# Patient Record
Sex: FEMALE | Race: BLACK OR AFRICAN AMERICAN | NOT HISPANIC OR LATINO | URBAN - METROPOLITAN AREA
[De-identification: names, ages, dates, MRNs, and addresses within clinical notes are randomized per-mention and may not be internally consistent; named-entity substitution may affect disease eponyms.]

---

## 2018-08-31 ENCOUNTER — OUTPATIENT (OUTPATIENT)
Dept: OUTPATIENT SERVICES | Facility: HOSPITAL | Age: 63
LOS: 1 days | End: 2018-08-31
Payer: COMMERCIAL

## 2018-08-31 VITALS
SYSTOLIC BLOOD PRESSURE: 159 MMHG | TEMPERATURE: 98 F | RESPIRATION RATE: 14 BRPM | HEART RATE: 70 BPM | DIASTOLIC BLOOD PRESSURE: 84 MMHG | OXYGEN SATURATION: 97 % | HEIGHT: 63 IN | WEIGHT: 186.07 LBS

## 2018-08-31 DIAGNOSIS — Z98.890 OTHER SPECIFIED POSTPROCEDURAL STATES: Chronic | ICD-10-CM

## 2018-08-31 DIAGNOSIS — D25.9 LEIOMYOMA OF UTERUS, UNSPECIFIED: ICD-10-CM

## 2018-08-31 DIAGNOSIS — Z87.42 PERSONAL HISTORY OF OTHER DISEASES OF THE FEMALE GENITAL TRACT: Chronic | ICD-10-CM

## 2018-08-31 DIAGNOSIS — Z01.818 ENCOUNTER FOR OTHER PREPROCEDURAL EXAMINATION: ICD-10-CM

## 2018-08-31 LAB
ALBUMIN SERPL ELPH-MCNC: 3.6 G/DL — SIGNIFICANT CHANGE UP (ref 3.3–5)
ALP SERPL-CCNC: 91 U/L — SIGNIFICANT CHANGE UP (ref 40–120)
ALT FLD-CCNC: 17 U/L — SIGNIFICANT CHANGE UP (ref 12–78)
ANION GAP SERPL CALC-SCNC: 7 MMOL/L — SIGNIFICANT CHANGE UP (ref 5–17)
AST SERPL-CCNC: 15 U/L — SIGNIFICANT CHANGE UP (ref 15–37)
BILIRUB SERPL-MCNC: 0.5 MG/DL — SIGNIFICANT CHANGE UP (ref 0.2–1.2)
BUN SERPL-MCNC: 8 MG/DL — SIGNIFICANT CHANGE UP (ref 7–23)
CALCIUM SERPL-MCNC: 8.8 MG/DL — SIGNIFICANT CHANGE UP (ref 8.5–10.1)
CHLORIDE SERPL-SCNC: 106 MMOL/L — SIGNIFICANT CHANGE UP (ref 96–108)
CO2 SERPL-SCNC: 31 MMOL/L — SIGNIFICANT CHANGE UP (ref 22–31)
CREAT SERPL-MCNC: 0.76 MG/DL — SIGNIFICANT CHANGE UP (ref 0.5–1.3)
GLUCOSE SERPL-MCNC: 123 MG/DL — HIGH (ref 70–99)
HCT VFR BLD CALC: 41.2 % — SIGNIFICANT CHANGE UP (ref 34.5–45)
HGB BLD-MCNC: 13.2 G/DL — SIGNIFICANT CHANGE UP (ref 11.5–15.5)
MCHC RBC-ENTMCNC: 25.9 PG — LOW (ref 27–34)
MCHC RBC-ENTMCNC: 32 GM/DL — SIGNIFICANT CHANGE UP (ref 32–36)
MCV RBC AUTO: 80.8 FL — SIGNIFICANT CHANGE UP (ref 80–100)
NRBC # BLD: 0 /100 WBCS — SIGNIFICANT CHANGE UP (ref 0–0)
PLATELET # BLD AUTO: 241 K/UL — SIGNIFICANT CHANGE UP (ref 150–400)
POTASSIUM SERPL-MCNC: 3.6 MMOL/L — SIGNIFICANT CHANGE UP (ref 3.5–5.3)
POTASSIUM SERPL-SCNC: 3.6 MMOL/L — SIGNIFICANT CHANGE UP (ref 3.5–5.3)
PROT SERPL-MCNC: 8.3 G/DL — SIGNIFICANT CHANGE UP (ref 6–8.3)
RBC # BLD: 5.1 M/UL — SIGNIFICANT CHANGE UP (ref 3.8–5.2)
RBC # FLD: 14.5 % — SIGNIFICANT CHANGE UP (ref 10.3–14.5)
SODIUM SERPL-SCNC: 144 MMOL/L — SIGNIFICANT CHANGE UP (ref 135–145)
WBC # BLD: 3.99 K/UL — SIGNIFICANT CHANGE UP (ref 3.8–10.5)
WBC # FLD AUTO: 3.99 K/UL — SIGNIFICANT CHANGE UP (ref 3.8–10.5)

## 2018-08-31 PROCEDURE — 93005 ELECTROCARDIOGRAM TRACING: CPT

## 2018-08-31 PROCEDURE — 85027 COMPLETE CBC AUTOMATED: CPT

## 2018-08-31 PROCEDURE — 80053 COMPREHEN METABOLIC PANEL: CPT

## 2018-08-31 PROCEDURE — G0463: CPT

## 2018-08-31 PROCEDURE — 86850 RBC ANTIBODY SCREEN: CPT

## 2018-08-31 PROCEDURE — 86901 BLOOD TYPING SEROLOGIC RH(D): CPT

## 2018-08-31 PROCEDURE — 86900 BLOOD TYPING SEROLOGIC ABO: CPT

## 2018-08-31 PROCEDURE — 93010 ELECTROCARDIOGRAM REPORT: CPT

## 2018-08-31 NOTE — H&P PST ADULT - PROBLEM SELECTOR PLAN 1
PST Labs; CBC, CMP, Type & Screen, EKG - Medical Clearance scheduled with Dr Burgess for Tuesday 9/4/18 @ 11:00 AM - Pt instructed to take Norvasc with small sip of water morning of surgery -pre-op instructions as well as pre-op wash instructions given to pt with understanding verbalized

## 2018-08-31 NOTE — H&P PST ADULT - HISTORY OF PRESENT ILLNESS
63 year old female  PMH HTN, Arthritis presents for PST prior to Total Abdominal Hysterectomy W/Bilateral Salpingo-Oopherectomy with Dr Solis Ruano on 18 - pt notes H/O Uterina Fibroids which she reports "sometimes are very painful." Notes "one time episode of post menopausal bleeding about 6 months ago but just that one time." Following discussions with Dr Ruano pt is electing for scheduled procedure. 63 year old female  PMH HTN, Arthritis presents for PST prior to Total Abdominal Hysterectomy W/Bilateral Salpingo-Oopherectomy with Dr Solis Ruano on 18 - pt notes H/O Uterina Fibroids which she reports "sometimes are very painful." Notes "one time episode of post menopausal bleeding about 6 months ago but just that one time." Pt notes there are multiple fibroids at least 5 but one is large." Pt also reports "pain when I bend over." - Denies use of any OTC pain medications - also reports intermittent cramping. Following discussions with Dr Ruano pt is electing for scheduled procedure.

## 2018-08-31 NOTE — H&P PST ADULT - ANESTHESIA, PREVIOUS REACTION, PROFILE
Doesn't remember what complications but states "they said they had to place tube down my throat for the colonoscopy" - pt states "I recovered fine"/respiratory complications

## 2018-08-31 NOTE — H&P PST ADULT - FAMILY HISTORY
Father  Still living? No  Family history of heart attack, Age at diagnosis: Age Unknown     Mother  Still living? No  Family history of stroke, Age at diagnosis: Age Unknown

## 2018-08-31 NOTE — H&P PST ADULT - ASSESSMENT
63 year old female with Leiomyoma of Uterus, Unspecified - scheduled for Total Abdominal Hysterectomy W/Bilateral Salpingo-Oopherectomy with Dr Solis Ruano on 9/12/18 -

## 2019-07-05 PROBLEM — I10 ESSENTIAL (PRIMARY) HYPERTENSION: Chronic | Status: ACTIVE | Noted: 2018-08-31

## 2019-07-05 PROBLEM — M54.5 LOW BACK PAIN: Chronic | Status: ACTIVE | Noted: 2018-08-31

## 2019-07-05 PROBLEM — D25.9 LEIOMYOMA OF UTERUS, UNSPECIFIED: Chronic | Status: ACTIVE | Noted: 2018-08-31

## 2019-07-05 PROBLEM — M19.90 UNSPECIFIED OSTEOARTHRITIS, UNSPECIFIED SITE: Chronic | Status: ACTIVE | Noted: 2018-08-31

## 2019-08-08 ENCOUNTER — APPOINTMENT (OUTPATIENT)
Dept: OBGYN | Facility: CLINIC | Age: 64
End: 2019-08-08

## 2019-08-14 ENCOUNTER — APPOINTMENT (OUTPATIENT)
Dept: OBGYN | Facility: CLINIC | Age: 64
End: 2019-08-14
Payer: COMMERCIAL

## 2019-08-14 PROCEDURE — 99243 OFF/OP CNSLTJ NEW/EST LOW 30: CPT

## 2019-10-18 ENCOUNTER — OUTPATIENT (OUTPATIENT)
Dept: OUTPATIENT SERVICES | Facility: HOSPITAL | Age: 64
LOS: 1 days | End: 2019-10-18
Payer: COMMERCIAL

## 2019-10-18 VITALS
WEIGHT: 190.04 LBS | HEIGHT: 60.5 IN | RESPIRATION RATE: 17 BRPM | HEART RATE: 75 BPM | OXYGEN SATURATION: 96 % | DIASTOLIC BLOOD PRESSURE: 88 MMHG | SYSTOLIC BLOOD PRESSURE: 153 MMHG | TEMPERATURE: 98 F

## 2019-10-18 DIAGNOSIS — Z01.818 ENCOUNTER FOR OTHER PREPROCEDURAL EXAMINATION: ICD-10-CM

## 2019-10-18 DIAGNOSIS — D25.1 INTRAMURAL LEIOMYOMA OF UTERUS: ICD-10-CM

## 2019-10-18 DIAGNOSIS — Z98.890 OTHER SPECIFIED POSTPROCEDURAL STATES: Chronic | ICD-10-CM

## 2019-10-18 DIAGNOSIS — Z87.42 PERSONAL HISTORY OF OTHER DISEASES OF THE FEMALE GENITAL TRACT: Chronic | ICD-10-CM

## 2019-10-18 PROCEDURE — G0463: CPT

## 2019-10-18 PROCEDURE — 80048 BASIC METABOLIC PNL TOTAL CA: CPT

## 2019-10-18 PROCEDURE — 85027 COMPLETE CBC AUTOMATED: CPT

## 2019-10-18 PROCEDURE — 86900 BLOOD TYPING SEROLOGIC ABO: CPT

## 2019-10-18 PROCEDURE — 86901 BLOOD TYPING SEROLOGIC RH(D): CPT

## 2019-10-18 PROCEDURE — 83036 HEMOGLOBIN GLYCOSYLATED A1C: CPT

## 2019-10-18 PROCEDURE — 86850 RBC ANTIBODY SCREEN: CPT

## 2019-10-18 RX ORDER — LIDOCAINE HCL 20 MG/ML
0.2 VIAL (ML) INJECTION ONCE
Refills: 0 | Status: DISCONTINUED | OUTPATIENT
Start: 2019-10-28 | End: 2019-10-28

## 2019-10-18 RX ORDER — CEFOTETAN DISODIUM 1 G
2 VIAL (EA) INJECTION ONCE
Refills: 0 | Status: COMPLETED | OUTPATIENT
Start: 2019-10-28 | End: 2019-10-28

## 2019-10-18 RX ORDER — SODIUM CHLORIDE 9 MG/ML
3 INJECTION INTRAMUSCULAR; INTRAVENOUS; SUBCUTANEOUS EVERY 8 HOURS
Refills: 0 | Status: DISCONTINUED | OUTPATIENT
Start: 2019-10-28 | End: 2019-10-28

## 2019-10-18 RX ORDER — FAMOTIDINE 10 MG/ML
20 INJECTION INTRAVENOUS ONCE
Refills: 0 | Status: COMPLETED | OUTPATIENT
Start: 2019-10-28 | End: 2019-10-28

## 2019-10-18 RX ORDER — CELECOXIB 200 MG/1
200 CAPSULE ORAL ONCE
Refills: 0 | Status: COMPLETED | OUTPATIENT
Start: 2019-10-28 | End: 2019-10-28

## 2019-10-18 NOTE — H&P PST ADULT - HISTORY OF PRESENT ILLNESS
64 year old female with hx of hypertension and fibroids.  The fibroids have increased in size causing back pain. Work up referred   for laparoscopic hysterectomy.

## 2019-10-18 NOTE — H&P PST ADULT - NSICDXPROBLEM_GEN_ALL_CORE_FT
PROBLEM DIAGNOSES  Problem: Intramural leiomyoma of uterus  Assessment and Plan: Total laparoscopic hysterectomy  bilateral salpingo oophorectomy  possible exploratory laparotomy  possible total abdominal hysterectomy

## 2019-10-18 NOTE — H&P PST ADULT - ATTENDING COMMENTS
pt with pelvic pain and enlarging fibroids for TLH BSO the risks and alternatives have been discussed .

## 2019-10-18 NOTE — H&P PST ADULT - NSICDXFAMILYHX_GEN_ALL_CORE_FT
FAMILY HISTORY:  Father  Still living? No  Family history of heart attack, Age at diagnosis: Age Unknown    Mother  Still living? No  Family history of stroke, Age at diagnosis: Age Unknown FAMILY HISTORY:  Family history of clotting disorder, 2 sisters  and one  brother    Father  Still living? No  Family history of heart attack, Age at diagnosis: Age Unknown    Mother  Still living? No  Family history of stroke, Age at diagnosis: Age Unknown

## 2019-10-18 NOTE — H&P PST ADULT - NSICDXPASTMEDICALHX_GEN_ALL_CORE_FT
PAST MEDICAL HISTORY:  Arthritis Bilateral Knees    Hypertension     Leiomyoma of uterus, unspecified     Low back pain PAST MEDICAL HISTORY:  Arthritis Bilateral Knees    History of blood clotting disorder work up by hematology results pending    Hypertension     Leiomyoma of uterus, unspecified     Low back pain

## 2019-10-27 ENCOUNTER — TRANSCRIPTION ENCOUNTER (OUTPATIENT)
Age: 64
End: 2019-10-27

## 2019-10-28 ENCOUNTER — OUTPATIENT (OUTPATIENT)
Dept: OUTPATIENT SERVICES | Facility: HOSPITAL | Age: 64
LOS: 1 days | End: 2019-10-28
Payer: COMMERCIAL

## 2019-10-28 ENCOUNTER — APPOINTMENT (OUTPATIENT)
Dept: OBGYN | Facility: HOSPITAL | Age: 64
End: 2019-10-28

## 2019-10-28 ENCOUNTER — RESULT REVIEW (OUTPATIENT)
Age: 64
End: 2019-10-28

## 2019-10-28 VITALS
WEIGHT: 194.01 LBS | HEART RATE: 83 BPM | RESPIRATION RATE: 18 BRPM | TEMPERATURE: 98 F | HEIGHT: 62 IN | OXYGEN SATURATION: 99 % | DIASTOLIC BLOOD PRESSURE: 90 MMHG | SYSTOLIC BLOOD PRESSURE: 145 MMHG

## 2019-10-28 DIAGNOSIS — D25.1 INTRAMURAL LEIOMYOMA OF UTERUS: ICD-10-CM

## 2019-10-28 DIAGNOSIS — Z87.42 PERSONAL HISTORY OF OTHER DISEASES OF THE FEMALE GENITAL TRACT: Chronic | ICD-10-CM

## 2019-10-28 DIAGNOSIS — Z98.890 OTHER SPECIFIED POSTPROCEDURAL STATES: Chronic | ICD-10-CM

## 2019-10-28 LAB — GLUCOSE BLDC GLUCOMTR-MCNC: 91 MG/DL — SIGNIFICANT CHANGE UP (ref 70–99)

## 2019-10-28 PROCEDURE — 58573 TLH W/T/O UTERUS OVER 250 G: CPT | Mod: 80

## 2019-10-28 PROCEDURE — 88305 TISSUE EXAM BY PATHOLOGIST: CPT | Mod: 26

## 2019-10-28 PROCEDURE — 58573 TLH W/T/O UTERUS OVER 250 G: CPT

## 2019-10-28 PROCEDURE — 88307 TISSUE EXAM BY PATHOLOGIST: CPT | Mod: 26

## 2019-10-28 RX ORDER — KETOROLAC TROMETHAMINE 30 MG/ML
30 SYRINGE (ML) INJECTION EVERY 6 HOURS
Refills: 0 | Status: COMPLETED | OUTPATIENT
Start: 2019-10-28 | End: 2019-11-02

## 2019-10-28 RX ORDER — ACETAMINOPHEN 500 MG
975 TABLET ORAL ONCE
Refills: 0 | Status: COMPLETED | OUTPATIENT
Start: 2019-10-28 | End: 2019-10-28

## 2019-10-28 RX ORDER — OXYCODONE HYDROCHLORIDE 5 MG/1
5 TABLET ORAL EVERY 4 HOURS
Refills: 0 | Status: DISCONTINUED | OUTPATIENT
Start: 2019-10-28 | End: 2019-10-28

## 2019-10-28 RX ORDER — POLYETHYLENE GLYCOL 3350 17 G/17G
17 POWDER, FOR SOLUTION ORAL EVERY 12 HOURS
Refills: 0 | Status: DISCONTINUED | OUTPATIENT
Start: 2019-10-28 | End: 2019-11-16

## 2019-10-28 RX ORDER — HEPARIN SODIUM 5000 [USP'U]/ML
5000 INJECTION INTRAVENOUS; SUBCUTANEOUS EVERY 12 HOURS
Refills: 0 | Status: DISCONTINUED | OUTPATIENT
Start: 2019-10-28 | End: 2019-11-16

## 2019-10-28 RX ORDER — SIMETHICONE 80 MG/1
80 TABLET, CHEWABLE ORAL
Refills: 0 | Status: DISCONTINUED | OUTPATIENT
Start: 2019-10-28 | End: 2019-11-16

## 2019-10-28 RX ORDER — ACETAMINOPHEN 500 MG
975 TABLET ORAL EVERY 6 HOURS
Refills: 0 | Status: DISCONTINUED | OUTPATIENT
Start: 2019-10-28 | End: 2019-11-16

## 2019-10-28 RX ORDER — HYDROMORPHONE HYDROCHLORIDE 2 MG/ML
0.25 INJECTION INTRAMUSCULAR; INTRAVENOUS; SUBCUTANEOUS
Refills: 0 | Status: DISCONTINUED | OUTPATIENT
Start: 2019-10-28 | End: 2019-10-29

## 2019-10-28 RX ORDER — SODIUM CHLORIDE 9 MG/ML
1000 INJECTION, SOLUTION INTRAVENOUS
Refills: 0 | Status: DISCONTINUED | OUTPATIENT
Start: 2019-10-28 | End: 2019-10-29

## 2019-10-28 RX ORDER — CHOLECALCIFEROL (VITAMIN D3) 125 MCG
1 CAPSULE ORAL
Qty: 0 | Refills: 0 | DISCHARGE

## 2019-10-28 RX ORDER — ONDANSETRON 8 MG/1
4 TABLET, FILM COATED ORAL ONCE
Refills: 0 | Status: DISCONTINUED | OUTPATIENT
Start: 2019-10-28 | End: 2019-10-29

## 2019-10-28 RX ORDER — AMLODIPINE BESYLATE 2.5 MG/1
1 TABLET ORAL
Qty: 0 | Refills: 0 | DISCHARGE

## 2019-10-28 RX ADMIN — Medication 975 MILLIGRAM(S): at 23:38

## 2019-10-28 RX ADMIN — CELECOXIB 200 MILLIGRAM(S): 200 CAPSULE ORAL at 14:01

## 2019-10-28 RX ADMIN — FAMOTIDINE 20 MILLIGRAM(S): 10 INJECTION INTRAVENOUS at 14:01

## 2019-10-28 RX ADMIN — SIMETHICONE 80 MILLIGRAM(S): 80 TABLET, CHEWABLE ORAL at 23:38

## 2019-10-28 RX ADMIN — SODIUM CHLORIDE 125 MILLILITER(S): 9 INJECTION, SOLUTION INTRAVENOUS at 20:44

## 2019-10-28 RX ADMIN — Medication 30 MILLIGRAM(S): at 23:38

## 2019-10-28 NOTE — CHART NOTE - NSCHARTNOTEFT_GEN_A_CORE
R2 OBGYN POST-OP CHECK    S: Patient seen and evaluated at bedside.  Pt awake and alert resting comfortaby in bed   Patient reports pain controlled with analgesia. Pt denies N/V, SOB, CP, palpitations, fever/chills. Voiding spontaneously. Has been OOB. Not yet tolerated PO    O:   T(C): 36.9 (10-28-19 @ 18:15), Max: 36.9 (10-28-19 @ 18:15)  HR: 89 (10-28-19 @ 20:30) (84 - 89)  BP: 148/77 (10-28-19 @ 20:30) (128/62 - 148/77)  RR: 15 (10-28-19 @ 20:30) (14 - 16)  SpO2: 99% (10-28-19 @ 20:30) (93% - 99%)  Wt(kg): --  I&O's Summary    28 Oct 2019 07:01  -  28 Oct 2019 20:54  --------------------------------------------------------  IN: 375 mL / OUT: 350 mL / NET: 25 mL        Gen: Resting comfortably in bed, NAD  CV: S1S2, RRR  Lungs: CTA B/L  Abd: soft, appropriately tender, Non-distended, no guarding/rebound  Inc: 4port incisions Clean/dry/intact w/ bandage in place  Ext: SCD's in place and functional, non-tender b/l, no edema        A/P: 64y Female s/p TLH, BSO, mini lap for specimen removal doing well post-operatively.    Neuro: PO Analgesia PRN   CV: Hemodynamically stable.  Monitor VS. CBC in AM.   Pulm: Saturating well on room air.  Encourage OOB and incentive spirometer use.   GI: Advance to regular diet. Anti-emetics PRN.  : Voiding  FEN: Electrolytes: LR@125cc/hr.  Heme: DVT ppx w/ SCD's while in bed. Early ambulation, initially with assistance then as tolerated.   ID: Afebrile  Endo: No active issues   Dispo: Discharge from PACU when criteria met.     Thomas Azar PGY-2

## 2019-10-28 NOTE — BRIEF OPERATIVE NOTE - NSICDXBRIEFPROCEDURE_GEN_ALL_CORE_FT
PROCEDURES:  Laparoscopic bilateral salpingo-oophorectomy 28-Oct-2019 18:07:25  Meryl Martel  Laparoscopic hysterectomy, total, uterus greater than 250 g 28-Oct-2019 18:07:13  Meryl Martel

## 2019-10-29 ENCOUNTER — TRANSCRIPTION ENCOUNTER (OUTPATIENT)
Age: 64
End: 2019-10-29

## 2019-10-29 VITALS
TEMPERATURE: 98 F | HEART RATE: 89 BPM | RESPIRATION RATE: 18 BRPM | OXYGEN SATURATION: 98 % | DIASTOLIC BLOOD PRESSURE: 83 MMHG | SYSTOLIC BLOOD PRESSURE: 139 MMHG

## 2019-10-29 DIAGNOSIS — Z98.890 OTHER SPECIFIED POSTPROCEDURAL STATES: ICD-10-CM

## 2019-10-29 LAB
BASOPHILS # BLD AUTO: 0.01 K/UL — SIGNIFICANT CHANGE UP (ref 0–0.2)
BASOPHILS NFR BLD AUTO: 0.1 % — SIGNIFICANT CHANGE UP (ref 0–2)
EOSINOPHIL # BLD AUTO: 0.14 K/UL — SIGNIFICANT CHANGE UP (ref 0–0.5)
EOSINOPHIL NFR BLD AUTO: 1.7 % — SIGNIFICANT CHANGE UP (ref 0–6)
HCT VFR BLD CALC: 40.3 % — SIGNIFICANT CHANGE UP (ref 34.5–45)
HGB BLD-MCNC: 12.6 G/DL — SIGNIFICANT CHANGE UP (ref 11.5–15.5)
IMM GRANULOCYTES NFR BLD AUTO: 0.2 % — SIGNIFICANT CHANGE UP (ref 0–1.5)
LYMPHOCYTES # BLD AUTO: 0.96 K/UL — LOW (ref 1–3.3)
LYMPHOCYTES # BLD AUTO: 11.7 % — LOW (ref 13–44)
MCHC RBC-ENTMCNC: 25 PG — LOW (ref 27–34)
MCHC RBC-ENTMCNC: 31.3 GM/DL — LOW (ref 32–36)
MCV RBC AUTO: 80.1 FL — SIGNIFICANT CHANGE UP (ref 80–100)
MONOCYTES # BLD AUTO: 0.28 K/UL — SIGNIFICANT CHANGE UP (ref 0–0.9)
MONOCYTES NFR BLD AUTO: 3.4 % — SIGNIFICANT CHANGE UP (ref 2–14)
NEUTROPHILS # BLD AUTO: 6.83 K/UL — SIGNIFICANT CHANGE UP (ref 1.8–7.4)
NEUTROPHILS NFR BLD AUTO: 82.9 % — HIGH (ref 43–77)
NRBC # BLD: 0 /100 WBCS — SIGNIFICANT CHANGE UP (ref 0–0)
PLATELET # BLD AUTO: 239 K/UL — SIGNIFICANT CHANGE UP (ref 150–400)
RBC # BLD: 5.03 M/UL — SIGNIFICANT CHANGE UP (ref 3.8–5.2)
RBC # FLD: 13.8 % — SIGNIFICANT CHANGE UP (ref 10.3–14.5)
WBC # BLD: 8.24 K/UL — SIGNIFICANT CHANGE UP (ref 3.8–10.5)
WBC # FLD AUTO: 8.24 K/UL — SIGNIFICANT CHANGE UP (ref 3.8–10.5)

## 2019-10-29 PROCEDURE — 85027 COMPLETE CBC AUTOMATED: CPT

## 2019-10-29 PROCEDURE — 82962 GLUCOSE BLOOD TEST: CPT

## 2019-10-29 PROCEDURE — 58573 TLH W/T/O UTERUS OVER 250 G: CPT

## 2019-10-29 PROCEDURE — C1889: CPT

## 2019-10-29 PROCEDURE — 86900 BLOOD TYPING SEROLOGIC ABO: CPT

## 2019-10-29 PROCEDURE — 88305 TISSUE EXAM BY PATHOLOGIST: CPT

## 2019-10-29 PROCEDURE — 86901 BLOOD TYPING SEROLOGIC RH(D): CPT

## 2019-10-29 PROCEDURE — 88307 TISSUE EXAM BY PATHOLOGIST: CPT

## 2019-10-29 RX ORDER — SIMETHICONE 80 MG/1
1 TABLET, CHEWABLE ORAL
Qty: 0 | Refills: 0 | DISCHARGE
Start: 2019-10-29

## 2019-10-29 RX ORDER — AMLODIPINE BESYLATE 2.5 MG/1
10 TABLET ORAL DAILY
Refills: 0 | Status: DISCONTINUED | OUTPATIENT
Start: 2019-10-29 | End: 2019-11-16

## 2019-10-29 RX ORDER — IBUPROFEN 200 MG
3 TABLET ORAL
Qty: 0 | Refills: 0 | DISCHARGE

## 2019-10-29 RX ORDER — ACETAMINOPHEN 500 MG
3 TABLET ORAL
Qty: 0 | Refills: 0 | DISCHARGE
Start: 2019-10-29

## 2019-10-29 RX ORDER — SODIUM CHLORIDE 9 MG/ML
3 INJECTION INTRAMUSCULAR; INTRAVENOUS; SUBCUTANEOUS EVERY 8 HOURS
Refills: 0 | Status: DISCONTINUED | OUTPATIENT
Start: 2019-10-29 | End: 2019-11-16

## 2019-10-29 RX ORDER — OXYCODONE HYDROCHLORIDE 5 MG/1
1 TABLET ORAL
Qty: 6 | Refills: 0
Start: 2019-10-29

## 2019-10-29 RX ADMIN — Medication 30 MILLIGRAM(S): at 06:30

## 2019-10-29 RX ADMIN — HEPARIN SODIUM 5000 UNIT(S): 5000 INJECTION INTRAVENOUS; SUBCUTANEOUS at 06:02

## 2019-10-29 RX ADMIN — Medication 30 MILLIGRAM(S): at 06:00

## 2019-10-29 RX ADMIN — Medication 975 MILLIGRAM(S): at 06:01

## 2019-10-29 RX ADMIN — Medication 30 MILLIGRAM(S): at 00:00

## 2019-10-29 RX ADMIN — Medication 975 MILLIGRAM(S): at 00:00

## 2019-10-29 RX ADMIN — POLYETHYLENE GLYCOL 3350 17 GRAM(S): 17 POWDER, FOR SOLUTION ORAL at 06:03

## 2019-10-29 RX ADMIN — SIMETHICONE 80 MILLIGRAM(S): 80 TABLET, CHEWABLE ORAL at 06:01

## 2019-10-29 RX ADMIN — SODIUM CHLORIDE 3 MILLILITER(S): 9 INJECTION INTRAMUSCULAR; INTRAVENOUS; SUBCUTANEOUS at 06:00

## 2019-10-29 RX ADMIN — Medication 975 MILLIGRAM(S): at 06:15

## 2019-10-29 NOTE — DISCHARGE NOTE PROVIDER - CARE PROVIDER_API CALL
Thomas Carlisle)  Obstetrics and Gynecology  [address] [place] [address], [address]  [place] [address]  Phone: [phone]  Fax: [phone]  Follow Up Time: 2 weeks

## 2019-10-29 NOTE — PROGRESS NOTE ADULT - ASSESSMENT
65 yo female POD#1 from [facility] TLH, BSO, mini lap for specimen removal in stable condition. Meeting postoperative milestones

## 2019-10-29 NOTE — DISCHARGE NOTE PROVIDER - NSDCFUSCHEDAPPT_GEN_ALL_CORE_FT
ANGEL JORDAN ; 11/14/2019 ; [facility]PP OB[facility]N  [facility] ANGEL JORDAN ; 11/14/2019 ; NPP OB/GYN  [facility]

## 2019-10-29 NOTE — DISCHARGE NOTE PROVIDER - HOSPITAL COURSE
Patient underwent LSC TLH, BSO and mini lap for specimen removal. . Postoperative course was uncomplicated. POD#1 patient voided, tolerated po and ambulated. Pain well controlled. Patient to have close follow up with Dr. Carlisle.

## 2019-10-29 NOTE — DISCHARGE NOTE PROVIDER - NSDCFUADDINST_GEN_ALL_CORE_FT
Follow up with Dr. Carlisle in 2 weeks. Take tylenol and motrin for pain control and oxycodone for severe pain. Do not drive while taking oxycodone. Call your doctor if you experience fever (>100.4), pain uncontrolled with medications, nausea/vomiting, inability to urinate.

## 2019-10-29 NOTE — PROGRESS NOTE ADULT - PROBLEM SELECTOR PLAN 1
- CV: hemodynamically stable, resume home norvasc  - Resp: saturating well on RA, encourage IS   - GI: reg diet, mylicon for gas pain  - Neuro: tylenol, motrin and oxycodone PRN for pain   - FEN: SLIV  - Ext: SCDs, HSQ and ambulation for DVT ppx, H/H stable   - Dispo: discharge this AM    AMAN Rodrigues pgy3

## 2019-10-29 NOTE — DISCHARGE NOTE PROVIDER - NSDCCPTREATMENT_GEN_ALL_CORE_FT
PRINCIPAL PROCEDURE  Procedure: Laparoscopic hysterectomy, total, uterus greater than 250 g  Findings and Treatment:       SECONDARY PROCEDURE  Procedure: Laparoscopic hysterectomy, total, uterus greater than 250 g  Findings and Treatment:

## 2019-10-29 NOTE — PROGRESS NOTE ADULT - ATTENDING COMMENTS
I have personally seen and examined the patient this morning. I have edited the above note and agree with the assessment and plan. I have discussed this patient with Dr. Carlisle.     Meryl Martel MD  Department of Obstetrics and Gynecology I have personally seen and examined the patient this morning. Patient remained in hospital overnight for higher than expected blood loss at the time of surgery and requiring a minilaparotomy to remove specimen. I have edited the above note and agree with the assessment and plan. I have discussed this patient with Dr. Carlisle.     Meryl Martel MD  Department of Obstetrics and Gynecology

## 2019-10-29 NOTE — PROGRESS NOTE ADULT - SUBJECTIVE AND OBJECTIVE BOX
POD#1   HD#2    Subjective  Patient seen and examined at bedside, no acute overnight events. This AM, no acute complaints. Reports pain well controlled with tylenol and toradol. Patient is ambulating, voiding spontaneously and tolerating regular diet. Has not yet passed flatus. Denies CP, SOB, N/V, fevers, and chills.    Objective  Vital Signs Last 24 Hours  T(C): 36.7 (10-29-19 @ 03:48), Max: 36.9 (10-28-19 @ 18:15)  HR: 93 (10-29-19 @ 04:00) (83 - 97)  BP: 154/83 (10-29-19 @ 03:48) (128/62 - 159/81)  RR: 17 (10-29-19 @ 04:00) (14 - 18)  SpO2: 96% (10-29-19 @ 04:00) (93% - 99%)    I&O's Summary    28 Oct 2019 07:01  -  29 Oct 2019 07:00  --------------------------------------------------------  IN: 995 mL / OUT: 2200 mL / NET: -1205 mL        Physical Exam:  General: NAD  CV: NR, RR, S1, S2, no M/R/G  Lungs: CTA-B, symmetrical expansion  Abdomen: Soft, non-tender, non-distended, +BS  Incision: Umbilical, 2 lateral lower quadrant port site and mini-lap incisions, CDI, steri strips in place  Ext: No pain or swelling    Labs:             12.6   8.24  )-----------( 239      ( 10-29 @ 04:16 )             40.3         MEDICATIONS  (STANDING):  acetaminophen   Tablet .. 975 milliGRAM(s) Oral every 6 hours  heparin  Injectable 5000 Unit(s) SubCutaneous every 12 hours  ketorolac   Injectable 30 milliGRAM(s) IV Push every 6 hours  polyethylene glycol 3350 17 Gram(s) Oral every 12 hours  simethicone 80 milliGRAM(s) Chew five times a day  sodium chloride 0.9% lock flush 3 milliLiter(s) IV Push every 8 hours    MEDICATIONS  (PRN):  HYDROmorphone  Injectable 0.25 milliGRAM(s) IV Push every 10 minutes PRN Moderate Pain (4 - 6)  ondansetron Injectable 4 milliGRAM(s) IV Push once PRN Nausea and/or Vomiting  oxyCODONE    IR 5 milliGRAM(s) Oral every 4 hours PRN Severe Pain (7 - 10)

## 2019-10-29 NOTE — DISCHARGE NOTE NURSING/CASE MANAGEMENT/SOCIAL WORK - PATIENT PORTAL LINK FT
You can access the FollowMyHealth Patient Portal offered by [facility] by registering at the following website: [email or web address]. By joining [employer]’s FollowMyHealth portal, you will also be able to view your health information using other applications (apps) compatible with our system.

## 2019-11-13 LAB — SURGICAL PATHOLOGY STUDY: SIGNIFICANT CHANGE UP

## 2019-11-21 ENCOUNTER — APPOINTMENT (OUTPATIENT)
Dept: OBGYN | Facility: CLINIC | Age: 64
End: 2019-11-21
Payer: COMMERCIAL

## 2019-11-21 PROCEDURE — 99024 POSTOP FOLLOW-UP VISIT: CPT

## 2019-11-22 PROBLEM — Z86.2 PERSONAL HISTORY OF DISEASES OF THE BLOOD AND BLOOD-FORMING ORGANS AND CERTAIN DISORDERS INVOLVING THE IMMUNE MECHANISM: Chronic | Status: ACTIVE | Noted: 2019-10-18

## 2019-12-05 ENCOUNTER — APPOINTMENT (OUTPATIENT)
Dept: OBGYN | Facility: CLINIC | Age: 64
End: 2019-12-05
Payer: COMMERCIAL

## 2019-12-05 PROCEDURE — 99024 POSTOP FOLLOW-UP VISIT: CPT
